# Patient Record
Sex: FEMALE | Race: BLACK OR AFRICAN AMERICAN | NOT HISPANIC OR LATINO | Employment: PART TIME | URBAN - METROPOLITAN AREA
[De-identification: names, ages, dates, MRNs, and addresses within clinical notes are randomized per-mention and may not be internally consistent; named-entity substitution may affect disease eponyms.]

---

## 2017-01-26 PROBLEM — M79.672 FOOT PAIN, BILATERAL: Status: ACTIVE | Noted: 2017-01-26

## 2017-01-26 PROBLEM — M79.671 FOOT PAIN, BILATERAL: Status: ACTIVE | Noted: 2017-01-26

## 2022-08-10 PROBLEM — M79.672 FOOT PAIN, BILATERAL: Status: RESOLVED | Noted: 2017-01-26 | Resolved: 2022-08-10

## 2022-08-10 PROBLEM — R29.898 WEAKNESS OF BOTH LOWER EXTREMITIES: Status: ACTIVE | Noted: 2022-08-10

## 2022-08-10 PROBLEM — R26.89 ANTALGIC GAIT: Status: ACTIVE | Noted: 2022-08-10

## 2022-08-10 PROBLEM — M25.671 DECREASED RANGE OF MOTION OF BOTH ANKLES: Status: ACTIVE | Noted: 2022-08-10

## 2022-08-10 PROBLEM — M79.671 FOOT PAIN, BILATERAL: Status: RESOLVED | Noted: 2017-01-26 | Resolved: 2022-08-10

## 2022-08-10 PROBLEM — M25.672 DECREASED RANGE OF MOTION OF BOTH ANKLES: Status: ACTIVE | Noted: 2022-08-10

## 2022-09-23 ENCOUNTER — TELEPHONE (OUTPATIENT)
Dept: SLEEP MEDICINE | Facility: CLINIC | Age: 57
End: 2022-09-23
Payer: MEDICAID

## 2022-09-23 PROBLEM — M25.671 DECREASED RANGE OF MOTION OF BOTH ANKLES: Status: RESOLVED | Noted: 2022-08-10 | Resolved: 2022-09-23

## 2022-09-23 PROBLEM — M25.672 DECREASED RANGE OF MOTION OF BOTH ANKLES: Status: RESOLVED | Noted: 2022-08-10 | Resolved: 2022-09-23

## 2022-09-23 PROBLEM — R29.898 WEAKNESS OF BOTH LOWER EXTREMITIES: Status: RESOLVED | Noted: 2022-08-10 | Resolved: 2022-09-23

## 2022-09-23 PROBLEM — R26.89 ANTALGIC GAIT: Status: RESOLVED | Noted: 2022-08-10 | Resolved: 2022-09-23

## 2022-09-23 NOTE — TELEPHONE ENCOUNTER
----- Message from Silvia Connor sent at 9/23/2022  2:12 PM CDT -----  Name of Who is Calling: JIGAR JACKSON [[id number]]              What is the request in detail: Patient is requesting a call back to schedule appointment with Dr. Allan              Can the clinic reply by [facility]: No              What Number to Call Back if not in [facility]: [phone]

## 2022-09-23 NOTE — TELEPHONE ENCOUNTER
----- Message from Nicho Head sent at 9/23/2022  1:28 PM CDT -----  Regarding: Sleep Clinic Referral    Hello,     Pt has been referred to Sleep Clinic regarding having difficulties sleeping.    Referral has been placed in      Please contact Pt to schedule appointment .    Thanks,

## 2022-09-27 ENCOUNTER — TELEPHONE (OUTPATIENT)
Dept: SLEEP MEDICINE | Facility: CLINIC | Age: 57
End: 2022-09-27
Payer: MEDICAID

## 2022-09-27 NOTE — TELEPHONE ENCOUNTER
----- Message from Nidhi Clay sent at 9/27/2022  8:19 AM CDT -----  Regarding: Return call    Type:  Patient Returning Call    Who Called:  JIGAR JACKSON [[id number]]    Who Left Message for Patient:  Allyson Mcgowan    Does the patient know what this is regarding Y    Can the clinic reply in [facility]: No    Best Call Back Number: [phone]    Additional Information: N/A

## 2022-10-17 ENCOUNTER — OFFICE VISIT (OUTPATIENT)
Dept: SLEEP MEDICINE | Facility: CLINIC | Age: 57
End: 2022-10-17
Payer: MEDICAID

## 2022-10-17 VITALS
HEIGHT: 64 IN | WEIGHT: 204.81 LBS | HEART RATE: 74 BPM | SYSTOLIC BLOOD PRESSURE: 126 MMHG | BODY MASS INDEX: 34.97 KG/M2 | DIASTOLIC BLOOD PRESSURE: 84 MMHG

## 2022-10-17 DIAGNOSIS — F41.9 ANXIETY: ICD-10-CM

## 2022-10-17 DIAGNOSIS — E66.9 OBESITY (BMI 35.0-39.9 WITHOUT COMORBIDITY): ICD-10-CM

## 2022-10-17 DIAGNOSIS — R06.83 SNORING: ICD-10-CM

## 2022-10-17 DIAGNOSIS — F51.04 CHRONIC INSOMNIA: Primary | ICD-10-CM

## 2022-10-17 DIAGNOSIS — G47.19 EXCESSIVE DAYTIME SLEEPINESS: ICD-10-CM

## 2022-10-17 PROCEDURE — 3074F SYST BP LT 130 MM HG: CPT | Mod: CPTII,,, | Performed by: INTERNAL MEDICINE

## 2022-10-17 PROCEDURE — 3079F DIAST BP 80-89 MM HG: CPT | Mod: CPTII,,, | Performed by: INTERNAL MEDICINE

## 2022-10-17 PROCEDURE — 99999 PR PBB SHADOW E&M-EST. PATIENT-LVL III: ICD-10-PCS | Mod: PBBFAC,,, | Performed by: INTERNAL MEDICINE

## 2022-10-17 PROCEDURE — 99999 PR PBB SHADOW E&M-EST. PATIENT-LVL III: CPT | Mod: PBBFAC,,, | Performed by: INTERNAL MEDICINE

## 2022-10-17 PROCEDURE — 1160F PR REVIEW ALL MEDS BY PRESCRIBER/CLIN PHARMACIST DOCUMENTED: ICD-10-PCS | Mod: CPTII,,, | Performed by: INTERNAL MEDICINE

## 2022-10-17 PROCEDURE — 1159F MED LIST DOCD IN RCRD: CPT | Mod: CPTII,,, | Performed by: INTERNAL MEDICINE

## 2022-10-17 PROCEDURE — 99204 PR OFFICE/OUTPT VISIT, NEW, LEVL IV, 45-59 MIN: ICD-10-PCS | Mod: S$PBB,,, | Performed by: INTERNAL MEDICINE

## 2022-10-17 PROCEDURE — 99213 OFFICE O/P EST LOW 20 MIN: CPT | Mod: PBBFAC | Performed by: INTERNAL MEDICINE

## 2022-10-17 PROCEDURE — 99204 OFFICE O/P NEW MOD 45 MIN: CPT | Mod: S$PBB,,, | Performed by: INTERNAL MEDICINE

## 2022-10-17 PROCEDURE — 3079F PR MOST RECENT DIASTOLIC BLOOD PRESSURE 80-89 MM HG: ICD-10-PCS | Mod: CPTII,,, | Performed by: INTERNAL MEDICINE

## 2022-10-17 PROCEDURE — 1160F RVW MEDS BY RX/DR IN RCRD: CPT | Mod: CPTII,,, | Performed by: INTERNAL MEDICINE

## 2022-10-17 PROCEDURE — 1159F PR MEDICATION LIST DOCUMENTED IN MEDICAL RECORD: ICD-10-PCS | Mod: CPTII,,, | Performed by: INTERNAL MEDICINE

## 2022-10-17 PROCEDURE — 3074F PR MOST RECENT SYSTOLIC BLOOD PRESSURE < 130 MM HG: ICD-10-PCS | Mod: CPTII,,, | Performed by: INTERNAL MEDICINE

## 2022-10-17 RX ORDER — MECLIZINE HYDROCHLORIDE 50 MG/1
50 TABLET ORAL 2 TIMES DAILY
COMMUNITY

## 2022-10-17 NOTE — PROGRESS NOTES
Subjective:       Patient ID: Bianca Keenan is a 57 y.o. female.    Chief Complaint: Snoring, Fatigue, and Insomnia    HPI  Bianca Keenan is a 57 y.o. female presents for a sleep evaluation for maintenance insomnia. Relevant medical history includes anxiety, hyperlipidemia and numbness of legs.Patient complaints of poor sleep maintenance with multiple awakenings at night and difficulty with falling back asleep.  She is still grieving passing of her mother from 2 years ago. Denies any improvement in insomnia when changing bedroom environment. Reports rumination when she wakes up and has a hard time shutting her mind down.    The patient reports witnessed snoring noticed by her son. There is no witnessed apneas. She has not awoken from a snore and has not gasping for air. When she wakes up from sleep, she does  feel un refreshed. There is  dry mouth and headaches. There has  been change in weight, with overall gain in weight of 10 pounds over the last year.    Symptoms began several years ago.    Prior sleep evaluation: none    Sleep medication taken: none.    Other sleep remedies: none.    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    Sleep summary:  Bedtime: 7 PM  Sleep Latency: 10 min  # Awakenings: 4-5  WASO (wakefulness after sleep onset) 60 min  Risetime: 5 AM      Snoring is usually of loud intensity.    Naps are not taken frequently.    There is no evidence of choking awakening.   Apneas while asleep have not been reported by others.    Headaches are  frequent.    Bruxism is  reported at this time.    Currently, vivid dreams are not reported, nightmares are not reported.    There are no reports of sleep talking no sleep walking.    Patient does  drink  caffeinated beverages daily. Last time of day around noon.    Alcoholic beverages are not ingested on a regular basis.     The bedroom environment is  adequate and  comfortable.           CONTRIBUTING and/or CONFOUNDING FACTORS:    Nocturnal pain:   n    Nocturia >2/night:   y  Heartburn/GI pain:   n  Environment:    n  Bed partner noise/movements : n      Patient provided ESS:    [place] Sleepiness Scale TOTAL   (validated sleepiness questionnaire with a higher score indicating greater sleepiness; range 0-24)  EPWORTH SLEEPINESS SCALE 10/17/2022   Sitting and reading 3   Watching TV 3   Sitting, inactive in a public place (e.g. a theatre or a meeting) 3   As a passenger in a car for an hour without a break 3   Lying down to rest in the afternoon when circumstances permit 3   Sitting and talking to someone 3   Sitting quietly after a lunch without alcohol 3   In a car, while stopped for a few minutes in traffic 0   Total score 21                 STOP BANG questionnaire:    Snoring present: y  Tiredness present:y  Obstruction (apneas/choking episodes): y  Pressure (HTN): n    BMI greater than 35 kg/m2: y  Age greater than 50 years old: y  Neck circumference > than 17 inches if male or > than 16 inches if female : y  Gender being male: n    Total STOP BANG score = 6/8  Low risk CYNTHIA: 0-2, Intermediate risk CYNTHIA: 3-4, High risk CYNTHIA: 5+      Most Recent Vital Signs:    The patient's body mass index is unknown because there is no height or weight on file.    Wt Readings from Last 5 Encounters:   10/17/22 92.9 kg (204 lb 12.9 oz)     BMI Readings from Last 5 Encounters:   10/17/22 35.16 kg/m²     Pulse Readings from Last 3 Encounters:   10/17/22 74         Current Outpatient Medications:     fluticasone (VERAMYST) 27.5 mcg/actuation nasal spray, 1 spray by Nasal route once daily., Disp: , Rfl:     meclizine (ANTIVERT) 50 MG tablet, Take 50 mg by mouth 2 (two) times daily., Disp: , Rfl:          Objective:      Physical Exam  Constitutional:       Appearance: Normal appearance. She is obese.   HENT:      Head: Normocephalic.      Nose: Nose normal.      Mouth/Throat:      Mouth: Mucous membranes are moist.       Comments: Additional deshaun-pharyngeal and neck physical exam:    Uvula is not visualized due to airway crowding   Willams scale: IV/IV  Tongue is  enlarged and with scalloped ridges  Nasal opening is normal and appears symmetric  Nasal allae is stable  Nasal septum is not deviated.  Nasal airway mucosa is normal  Soft pallate is enlarged and with medial crowding  Neck circumference of 16 inches.  Retrognathia is not present  Cardiovascular:      Rate and Rhythm: Normal rate and regular rhythm.      Heart sounds: Normal heart sounds. No murmur heard.  Pulmonary:      Effort: Pulmonary effort is normal.      Breath sounds: Normal breath sounds. No wheezing.   Musculoskeletal:         General: Normal range of motion.      Cervical back: Normal range of motion and neck supple.   Skin:     General: Skin is warm and dry.      Capillary Refill: Capillary refill takes less than 2 seconds.   Neurological:      General: No focal deficit present.      Mental Status: She is alert and oriented to person, place, and time. Mental status is at baseline.       Assessment:       Problem List Items Addressed This Visit    None  Visit Diagnoses       Chronic insomnia    -  Primary    Anxiety        Snoring        Excessive daytime sleepiness        Obesity (BMI 35.0-39.9 without comorbidity)                Plan:       1. Chronic insomnia  Psychophysiological and related to anxiety and likely underlying sleep disordered breathing  2. Anxiety          Bed restriction and relaxation techniques discussed with patient. Avoidance of lying in bed for prolonged period of time not asleep. Upon awakening engage in exposure to bright light. Do not read or watch television. Do not take naps during the day. If unable to fall asleep (or back to sleep) within 15 minutes, remove yourself from bed and engage in relaxing activity until drowsy, then return to bed.    3. Snoring  4. Excessive daytime sleepiness  5. Obesity (BMI 35.0-39.9 without  comorbidity)    Most likely secondary to sleep disordered breathing of obstructive origin.     Patient has been informed about the pathophysiology and prognosis associated with CYNTHIA and has been advised to undergo a baseline Polysomnography (PSG) study for further evaluation of his sleep disorder breathing.    Patient understood and agreed to undergo for a PSG study. Will order Home Sleep Study and proceed with further studies or prescription for CPAP as appropriate.    Patient has been advised to loose weight through a diet and exercise plan.      Today's office encounter included review of salient clinical notes from others involved in the care of the patient, discrete laboratory elements and, as available, prior and present intervention for the disturbance of sleep.  The information gleaned was used in establishing the diagnosis and in stratification of disease risk.    The patient has a disturbance of sleep with clinically relevant potential for adverse behavioral, cardiovascular, and metabolic outcomes.     All patient's questions and concerns regarding Sleep Disorder Breathing were addressed during this visit.    I will follow up the patient closely. Will follow-up patient with results of PSG  RTC 3 months

## 2022-10-18 ENCOUNTER — TELEPHONE (OUTPATIENT)
Dept: SLEEP MEDICINE | Facility: HOSPITAL | Age: 57
End: 2022-10-18
Payer: MEDICAID

## 2022-10-18 PROBLEM — M25.69 DECREASED RANGE OF MOTION OF TRUNK AND BACK: Status: ACTIVE | Noted: 2022-10-18

## 2022-10-18 PROBLEM — M62.81 WEAKNESS OF TRUNK MUSCULATURE: Status: ACTIVE | Noted: 2022-10-18

## 2022-10-18 PROBLEM — M62.9 HAMSTRING TIGHTNESS OF BOTH LOWER EXTREMITIES: Status: ACTIVE | Noted: 2022-10-18

## 2022-10-18 PROBLEM — R29.898 WEAKNESS OF BOTH LOWER EXTREMITIES: Status: ACTIVE | Noted: 2022-10-18

## 2022-10-31 ENCOUNTER — HOSPITAL ENCOUNTER (OUTPATIENT)
Dept: SLEEP MEDICINE | Facility: HOSPITAL | Age: 57
Discharge: HOME | End: 2022-10-31
Attending: INTERNAL MEDICINE
Payer: MEDICAID

## 2022-10-31 DIAGNOSIS — F51.04 CHRONIC INSOMNIA: ICD-10-CM

## 2022-10-31 DIAGNOSIS — R06.83 SNORING: ICD-10-CM

## 2022-10-31 DIAGNOSIS — G47.19 EXCESSIVE DAYTIME SLEEPINESS: ICD-10-CM

## 2022-10-31 DIAGNOSIS — G47.33 OSA (OBSTRUCTIVE SLEEP APNEA): Primary | ICD-10-CM

## 2022-10-31 PROCEDURE — 95800 SLP STDY UNATTENDED: CPT

## 2022-10-31 PROCEDURE — 95806 SLEEP STUDY UNATT&RESP EFFT: CPT | Mod: 26,52,, | Performed by: INTERNAL MEDICINE

## 2022-10-31 PROCEDURE — 95806 PR SLEEP STUDY, UNATTENDED, SIMUL RECORD HR/O2 SAT/RESP FLOW/RESP EFFT: ICD-10-PCS | Mod: 26,52,, | Performed by: INTERNAL MEDICINE

## 2022-11-09 PROBLEM — G47.33 OSA (OBSTRUCTIVE SLEEP APNEA): Status: ACTIVE | Noted: 2022-11-09

## 2022-11-10 ENCOUNTER — TELEPHONE (OUTPATIENT)
Dept: SLEEP MEDICINE | Facility: CLINIC | Age: 57
End: 2022-11-10
Payer: MEDICAID

## 2022-11-10 DIAGNOSIS — G47.33 OSA (OBSTRUCTIVE SLEEP APNEA): Primary | ICD-10-CM

## 2022-11-10 NOTE — TELEPHONE ENCOUNTER
Reviewed result of home sleep stud with patient, consistent with severe obstructive sleep apnea.  It would be reasonable to try treating sleep apnea with CPAP to see how much quality of sleep and sleepiness improves.  Patient would like to try CPAP. I have placed order for CPAP. All questions and concerns answered.

## 2023-01-17 PROBLEM — M62.9 HAMSTRING TIGHTNESS OF BOTH LOWER EXTREMITIES: Status: RESOLVED | Noted: 2022-10-18 | Resolved: 2023-01-17

## 2023-01-17 PROBLEM — M25.69 DECREASED RANGE OF MOTION OF TRUNK AND BACK: Status: RESOLVED | Noted: 2022-10-18 | Resolved: 2023-01-17

## 2023-01-17 PROBLEM — R29.898 WEAKNESS OF BOTH LOWER EXTREMITIES: Status: RESOLVED | Noted: 2022-10-18 | Resolved: 2023-01-17

## 2023-01-17 PROBLEM — M62.81 WEAKNESS OF TRUNK MUSCULATURE: Status: RESOLVED | Noted: 2022-10-18 | Resolved: 2023-01-17

## 2023-10-25 ENCOUNTER — TELEPHONE (OUTPATIENT)
Dept: PULMONOLOGY | Facility: CLINIC | Age: 58
End: 2023-10-25
Payer: MEDICAID

## 2023-10-25 NOTE — TELEPHONE ENCOUNTER
----- Message from Magnolia Obrien sent at 10/25/2023  9:59 AM CDT -----  Regarding: sleep study results  Name of Who is Calling:JIGAR JACKSON [[id number]]          What is the request in detail:Pt is asking if she can get  a copy of her sleep study results. She is asking how can she obtain a copy. Please call back to assist       Can the clinic reply by [facility]:          What Number to Call Back if not [facility]:[phone]

## 2023-10-25 NOTE — TELEPHONE ENCOUNTER
I called Mrs Keenan and told her I have a copy of her sleep study and would she like me to fax, email, or mail it to her and to please call back. Ruba Cheema

## 2023-10-27 ENCOUNTER — TELEPHONE (OUTPATIENT)
Dept: PULMONOLOGY | Facility: CLINIC | Age: 58
End: 2023-10-27
Payer: MEDICAID
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
## 2023-10-27 NOTE — TELEPHONE ENCOUNTER
----- Message from Sneha Young sent at 10/27/2023 10:50 AM CDT -----  Regarding: appt  Contact: @ [phone]  Pt requesting a appointment for the following a follow up appointment and a CPAP machine ...Please call and adv @ [phone]

## 2023-11-02 ENCOUNTER — TELEPHONE (OUTPATIENT)
Dept: PULMONOLOGY | Facility: CLINIC | Age: 58
End: 2023-11-02
Payer: MEDICAID

## 2023-11-02 NOTE — TELEPHONE ENCOUNTER
Call was returned. Patient stated she would like to speak with Ms Ruba. I informed patient that she's not in office today. I offer to assist but patient will call tomorrow for Ms Yeh.

## 2023-11-02 NOTE — TELEPHONE ENCOUNTER
----- Message from Arya North sent at 11/2/2023  9:34 AM CDT -----  Contact: [phone]  The patient would like a call back at your earliest convince.  The pt can be reached at    [phone]